# Patient Record
Sex: FEMALE | Race: WHITE
[De-identification: names, ages, dates, MRNs, and addresses within clinical notes are randomized per-mention and may not be internally consistent; named-entity substitution may affect disease eponyms.]

---

## 2019-10-12 ENCOUNTER — HOSPITAL ENCOUNTER (EMERGENCY)
Dept: HOSPITAL 62 - ER | Age: 64
LOS: 1 days | Discharge: HOME | End: 2019-10-13
Payer: MEDICARE

## 2019-10-12 DIAGNOSIS — R19.7: Primary | ICD-10-CM

## 2019-10-12 DIAGNOSIS — R11.2: ICD-10-CM

## 2019-10-12 DIAGNOSIS — R10.84: ICD-10-CM

## 2019-10-12 LAB
ADD MANUAL DIFF: NO
ALBUMIN SERPL-MCNC: 4.4 G/DL (ref 3.5–5)
ALP SERPL-CCNC: 113 U/L (ref 38–126)
ANION GAP SERPL CALC-SCNC: 16 MMOL/L (ref 5–19)
AST SERPL-CCNC: 24 U/L (ref 14–36)
BASOPHILS # BLD AUTO: 0 10^3/UL (ref 0–0.2)
BASOPHILS NFR BLD AUTO: 0.5 % (ref 0–2)
BILIRUB DIRECT SERPL-MCNC: 0.2 MG/DL (ref 0–0.4)
BILIRUB SERPL-MCNC: 0.5 MG/DL (ref 0.2–1.3)
BUN SERPL-MCNC: 20 MG/DL (ref 7–20)
CALCIUM: 10.2 MG/DL (ref 8.4–10.2)
CHLORIDE SERPL-SCNC: 107 MMOL/L (ref 98–107)
CO2 SERPL-SCNC: 19 MMOL/L (ref 22–30)
EOSINOPHIL # BLD AUTO: 0.1 10^3/UL (ref 0–0.6)
EOSINOPHIL NFR BLD AUTO: 0.6 % (ref 0–6)
ERYTHROCYTE [DISTWIDTH] IN BLOOD BY AUTOMATED COUNT: 13.7 % (ref 11.5–14)
GLUCOSE SERPL-MCNC: 181 MG/DL (ref 75–110)
HCT VFR BLD CALC: 44.2 % (ref 36–47)
HGB BLD-MCNC: 14.9 G/DL (ref 12–15.5)
LYMPHOCYTES # BLD AUTO: 2.4 10^3/UL (ref 0.5–4.7)
LYMPHOCYTES NFR BLD AUTO: 25.8 % (ref 13–45)
MCH RBC QN AUTO: 31.2 PG (ref 27–33.4)
MCHC RBC AUTO-ENTMCNC: 33.7 G/DL (ref 32–36)
MCV RBC AUTO: 93 FL (ref 80–97)
MONOCYTES # BLD AUTO: 0.5 10^3/UL (ref 0.1–1.4)
MONOCYTES NFR BLD AUTO: 5 % (ref 3–13)
NEUTROPHILS # BLD AUTO: 6.3 10^3/UL (ref 1.7–8.2)
NEUTS SEG NFR BLD AUTO: 68.1 % (ref 42–78)
PLATELET # BLD: 166 10^3/UL (ref 150–450)
POTASSIUM SERPL-SCNC: 3.8 MMOL/L (ref 3.6–5)
PROT SERPL-MCNC: 7.3 G/DL (ref 6.3–8.2)
RBC # BLD AUTO: 4.78 10^6/UL (ref 3.72–5.28)
TOTAL CELLS COUNTED % (AUTO): 100 %
WBC # BLD AUTO: 9.2 10^3/UL (ref 4–10.5)

## 2019-10-12 PROCEDURE — 36415 COLL VENOUS BLD VENIPUNCTURE: CPT

## 2019-10-12 PROCEDURE — 81001 URINALYSIS AUTO W/SCOPE: CPT

## 2019-10-12 PROCEDURE — 83690 ASSAY OF LIPASE: CPT

## 2019-10-12 PROCEDURE — 80053 COMPREHEN METABOLIC PANEL: CPT

## 2019-10-12 PROCEDURE — 93010 ELECTROCARDIOGRAM REPORT: CPT

## 2019-10-12 PROCEDURE — 85025 COMPLETE CBC W/AUTO DIFF WBC: CPT

## 2019-10-12 PROCEDURE — 93005 ELECTROCARDIOGRAM TRACING: CPT

## 2019-10-13 VITALS — DIASTOLIC BLOOD PRESSURE: 93 MMHG | SYSTOLIC BLOOD PRESSURE: 155 MMHG

## 2019-10-13 LAB
APPEARANCE UR: (no result)
APTT PPP: (no result) S
BILIRUB UR QL STRIP: NEGATIVE
GLUCOSE UR STRIP-MCNC: NEGATIVE MG/DL
KETONES UR STRIP-MCNC: 20 MG/DL
NITRITE UR QL STRIP: NEGATIVE
PH UR STRIP: 5 [PH] (ref 5–9)
PROT UR STRIP-MCNC: 100 MG/DL
SP GR UR STRIP: 1.03
UROBILINOGEN UR-MCNC: NEGATIVE MG/DL (ref ?–2)

## 2019-10-13 NOTE — ER DOCUMENT REPORT
ED General





- General


Chief Complaint: Abdominal Pain


Stated Complaint: ABDOMINAL PAIN,VOMITING,DIARRHEA


Time Seen by Provider: 10/12/19 23:14


TRAVEL OUTSIDE OF THE [place] IN LAST 30 DAYS: No





- HPI


Notes: 





Patient is a 64-year-old female who comes to the emergency department for 

evaluation.  Patient is significantly uncomfortable upon arrival, history is 

obtained from patient, , sister-in-law.  Evidently she had multiple loose

bowel movements today.  She had at least 5 trips to the bathroom.  After dinner,

patient had sudden onset cramping abdominal pain.  She went to sit down to have 

a bowel movement.  At that point she started vomiting.  She had at least 10 

episodes of nonbloody, nonbilious emesis.  She mostly vomited up her dinner.  

She has a cramping abdominal pain that is diffuse in nature.  She denies any 

urinary symptoms.  No one else in the household is sick at this time, they all 

ate the same dinner.





- Related Data


Allergies/Adverse Reactions: 


                                        





diphenhydramine HCl [From Benadryl] Adverse Reaction (Verified 10/12/19 23:07)


   








Home Medications: Arimidex





Past Medical History





- General


Information source: Patient, Relative





- Social History


Smoking Status: Never Smoker


Frequency of alcohol use: None


Drug Abuse: None


Family History: Reviewed & Not Pertinent


Patient has suicidal ideation: No


Patient has homicidal ideation: No


Malignancy Medical History: Reports: Hx Bone Cancer, Hx Breast Cancer


Psychiatric Medical History: 


   Denies: Hx Depression


Past Surgical History: Reports: Hx Breast Surgery, Hx Cholecystectomy





- Immunizations


Hx Pneumococcal Vaccination: 09/01/14





Review of Systems





- Review of Systems


Constitutional: No symptoms reported


EENT: No symptoms reported


Cardiovascular: No symptoms reported


Respiratory: No symptoms reported


Gastrointestinal: See HPI


Genitourinary: No symptoms reported


Musculoskeletal: No symptoms reported


Skin: No symptoms reported


Neurological/Psychological: No symptoms reported





Physical Exam





- Vital signs


Vitals: 





                                        











BP


 


 158/103 H


 


 10/12/19 23:02














- Notes


Notes: 





This is a 64-year-old female who appears her stated age in moderate amount of 

distress.  She is rolling around on the bed, intermittently vomiting.  Vital 

signs reviewed, please refer to chart. Head is normocephalic, atraumatic.  

Pupils equal round, reactive to light.  Neck is supple without meningismus.  

Heart is regular rate and rhythm.  Lungs are clear to auscultation bilaterally. 

Abdomen is soft, nontender, normoactive bowel sounds throughout.  Extremities 

without cyanosis, clubbing. Posterior calves are nontender.  Peripheral pulses 

are equal.  Skin is warm damp to the touch.  Patient is awake, alert, 

neurological exam is nonfocal.





Course





- Re-evaluation


Re-evalutation: 





10/13/19 01:51


Patient presents emergency department for evaluation of abdominal pain, nausea, 

vomiting, diarrhea.  Her abdominal exam is entirely benign.  Serial abdominal 

exams failed to reveal any rebound or guarding.  Patient was medicated with 

morphine and Phenergan, as she was significantly uncomfortable, actively v

omiting.  Patient reacted strongly to medication, did become mildly hypoxic and 

required oxygen.  She remained stable throughout the course of her stay.  

Laboratory investigations did reveal some dehydration.  Patient was given 2 L of

IV fluids.  We will send her home with Zofran and close follow-up.  She is to 

return to the ED with worsening or new concerning symptoms of any sort.





- Vital Signs


Vital signs: 





                                        











Temp Pulse Resp BP Pulse Ox


 


       14   153/90 H  100 


 


       10/13/19 01:01  10/13/19 01:01  10/13/19 00:01














- Laboratory


Result Diagrams: 


                                 10/12/19 23:05





                                 10/12/19 23:05


Laboratory results interpreted by me: 





                                        











  10/12/19 10/12/19





  23:05 23:53


 


Carbon Dioxide  19 L 


 


Est GFR ( Amer)  57 L 


 


Est GFR (MDRD) Non-Af  47 L 


 


Glucose  181 H 


 


Urine Protein   100 H


 


Urine Ketones   20 H


 


Urine Blood   MODERATE H


 


Ur Leukocyte Esterase   SMALL H


 


Urine Ascorbic Acid   40 H














Discharge





- Discharge


Clinical Impression: 


 Diarrhea, Generalized abdominal pain





Nausea and vomiting


Qualifiers:


 Vomiting type: unspecified Vomiting Intractability: non-intractable Qualified 

Code(s): R11.2 - Nausea with vomiting, unspecified





Condition: Stable


Disposition: HOME, SELF-CARE


Instructions:  Abdominal Pain ([facility]), Antinausea Medication ([facility]), Intravenous 

(IV) Fluids ([facility]), Vomiting ([facility]), Diarrhea, Nonspecific ([facility])


Additional Instructions: 


Rest and stay well-hydrated.  Zofran as needed for nausea.  Clear liquids only, 

advance slowly to bland diet as tolerated.  Follow-up with your primary care 

physician on Monday.  If you develop increased pain, or new or concerning 

symptoms of any sort, return immediately to the emergency department for 

reevaluation.

## 2024-09-28 NOTE — EKG REPORT
SEVERITY:- ABNORMAL ECG -

SINUS RHYTHM

FIRST DEGREE AV BLOCK

BORDERLINE R WAVE PROGRESSION, ANTERIOR LEADS

:

Confirmed by: Anderson Flowers MD 13-Oct-2019 08:36:18 PAST SURGICAL HISTORY:  No significant past surgical history